# Patient Record
Sex: FEMALE | Race: WHITE | Employment: UNEMPLOYED | URBAN - METROPOLITAN AREA
[De-identification: names, ages, dates, MRNs, and addresses within clinical notes are randomized per-mention and may not be internally consistent; named-entity substitution may affect disease eponyms.]

---

## 2017-02-06 ENCOUNTER — OFFICE VISIT (OUTPATIENT)
Dept: ORTHOPEDIC SURGERY | Age: 82
End: 2017-02-06

## 2017-02-06 VITALS
BODY MASS INDEX: 20.09 KG/M2 | WEIGHT: 125 LBS | HEIGHT: 66 IN | RESPIRATION RATE: 16 BRPM | HEART RATE: 78 BPM | DIASTOLIC BLOOD PRESSURE: 88 MMHG | SYSTOLIC BLOOD PRESSURE: 134 MMHG

## 2017-02-06 DIAGNOSIS — S72.141D INTERTROCHANTERIC FRACTURE OF RIGHT FEMUR, CLOSED, WITH ROUTINE HEALING, SUBSEQUENT ENCOUNTER: Primary | ICD-10-CM

## 2017-02-06 PROCEDURE — 99213 OFFICE O/P EST LOW 20 MIN: CPT | Performed by: ORTHOPAEDIC SURGERY

## 2017-02-06 PROCEDURE — 73502 X-RAY EXAM HIP UNI 2-3 VIEWS: CPT | Performed by: ORTHOPAEDIC SURGERY

## 2017-02-06 RX ORDER — OMEGA-3 FATTY ACIDS/FISH OIL 300-1000MG
1 CAPSULE ORAL
COMMUNITY

## 2017-02-09 ENCOUNTER — OFFICE VISIT (OUTPATIENT)
Dept: ENDOCRINOLOGY | Age: 82
End: 2017-02-09

## 2017-02-09 VITALS
OXYGEN SATURATION: 96 % | BODY MASS INDEX: 20.62 KG/M2 | WEIGHT: 120.8 LBS | SYSTOLIC BLOOD PRESSURE: 135 MMHG | HEIGHT: 64 IN | HEART RATE: 79 BPM | RESPIRATION RATE: 16 BRPM | DIASTOLIC BLOOD PRESSURE: 86 MMHG

## 2017-02-09 DIAGNOSIS — M81.0 OSTEOPOROSIS: Primary | ICD-10-CM

## 2017-02-09 DIAGNOSIS — M81.0 OSTEOPOROSIS: ICD-10-CM

## 2017-02-09 LAB
A/G RATIO: 1.7 (ref 1.1–2.2)
ALBUMIN SERPL-MCNC: 4.3 G/DL (ref 3.4–5)
ALP BLD-CCNC: 72 U/L (ref 40–129)
ALT SERPL-CCNC: 13 U/L (ref 10–40)
ANION GAP SERPL CALCULATED.3IONS-SCNC: 13 MMOL/L (ref 3–16)
AST SERPL-CCNC: 19 U/L (ref 15–37)
BILIRUB SERPL-MCNC: 0.7 MG/DL (ref 0–1)
BUN BLDV-MCNC: 24 MG/DL (ref 7–20)
CALCIUM SERPL-MCNC: 10 MG/DL (ref 8.3–10.6)
CHLORIDE BLD-SCNC: 100 MMOL/L (ref 99–110)
CO2: 26 MMOL/L (ref 21–32)
CREAT SERPL-MCNC: 1.1 MG/DL (ref 0.6–1.2)
GFR AFRICAN AMERICAN: 58
GFR NON-AFRICAN AMERICAN: 48
GLOBULIN: 2.6 G/DL
GLUCOSE BLD-MCNC: 89 MG/DL (ref 70–99)
PARATHYROID HORMONE INTACT: 42.3 PG/ML (ref 14–72)
PHOSPHORUS: 3.8 MG/DL (ref 2.5–4.9)
POTASSIUM SERPL-SCNC: 4.9 MMOL/L (ref 3.5–5.1)
SODIUM BLD-SCNC: 139 MMOL/L (ref 136–145)
TOTAL PROTEIN: 6.9 G/DL (ref 6.4–8.2)
VITAMIN D 25-HYDROXY: 58.1 NG/ML

## 2017-02-09 PROCEDURE — 99205 OFFICE O/P NEW HI 60 MIN: CPT | Performed by: INTERNAL MEDICINE

## 2017-02-10 ENCOUNTER — TELEPHONE (OUTPATIENT)
Age: 82
End: 2017-02-10

## 2017-02-23 ENCOUNTER — TELEPHONE (OUTPATIENT)
Dept: ENDOCRINOLOGY | Age: 82
End: 2017-02-23

## 2017-02-28 ENCOUNTER — TELEPHONE (OUTPATIENT)
Dept: ENDOCRINOLOGY | Age: 82
End: 2017-02-28

## 2017-03-16 ENCOUNTER — NURSE ONLY (OUTPATIENT)
Age: 82
End: 2017-03-16

## 2017-03-16 DIAGNOSIS — M81.0 OSTEOPOROSIS: Primary | ICD-10-CM

## 2017-03-16 PROCEDURE — 96372 THER/PROPH/DIAG INJ SC/IM: CPT | Performed by: INTERNAL MEDICINE

## 2017-04-03 ENCOUNTER — OFFICE VISIT (OUTPATIENT)
Dept: ORTHOPEDIC SURGERY | Age: 82
End: 2017-04-03

## 2017-04-03 VITALS
SYSTOLIC BLOOD PRESSURE: 131 MMHG | HEIGHT: 63 IN | WEIGHT: 120 LBS | DIASTOLIC BLOOD PRESSURE: 83 MMHG | HEART RATE: 72 BPM | BODY MASS INDEX: 21.26 KG/M2

## 2017-04-03 DIAGNOSIS — S72.141A INTERTROCHANTERIC FRACTURE OF RIGHT FEMUR, CLOSED, INITIAL ENCOUNTER (HCC): Primary | ICD-10-CM

## 2017-04-03 PROCEDURE — 99213 OFFICE O/P EST LOW 20 MIN: CPT | Performed by: ORTHOPAEDIC SURGERY

## 2017-04-03 PROCEDURE — 73502 X-RAY EXAM HIP UNI 2-3 VIEWS: CPT | Performed by: ORTHOPAEDIC SURGERY

## 2017-09-06 ENCOUNTER — TELEPHONE (OUTPATIENT)
Dept: ENDOCRINOLOGY | Age: 82
End: 2017-09-06

## 2017-11-14 ENCOUNTER — HOSPITAL ENCOUNTER (OUTPATIENT)
Dept: OTHER | Age: 82
Discharge: HOME | End: 2017-11-14
Attending: NURSE PRACTITIONER | Admitting: NURSE PRACTITIONER

## 2017-11-14 DIAGNOSIS — R22.2 MASS IN CHEST: ICD-10-CM

## 2017-11-29 ENCOUNTER — HOSPITAL ENCOUNTER (OUTPATIENT)
Dept: CT IMAGING | Age: 82
Discharge: HOME | End: 2017-11-29
Attending: OBSTETRICS & GYNECOLOGY | Admitting: OBSTETRICS & GYNECOLOGY

## 2017-11-29 DIAGNOSIS — R22.2 LOCALIZED SWELLING, MASS AND LUMP, TRUNK: ICD-10-CM

## 2018-03-22 ENCOUNTER — OFFICE VISIT (OUTPATIENT)
Dept: ENDOCRINOLOGY | Age: 83
End: 2018-03-22

## 2018-03-22 VITALS
OXYGEN SATURATION: 97 % | WEIGHT: 120.6 LBS | RESPIRATION RATE: 14 BRPM | SYSTOLIC BLOOD PRESSURE: 126 MMHG | BODY MASS INDEX: 20.59 KG/M2 | HEART RATE: 72 BPM | HEIGHT: 64 IN | DIASTOLIC BLOOD PRESSURE: 85 MMHG

## 2018-03-22 DIAGNOSIS — M81.0 OSTEOPOROSIS, POST-MENOPAUSAL: Primary | ICD-10-CM

## 2018-03-22 PROCEDURE — 99213 OFFICE O/P EST LOW 20 MIN: CPT | Performed by: INTERNAL MEDICINE

## 2018-03-22 PROCEDURE — 96372 THER/PROPH/DIAG INJ SC/IM: CPT | Performed by: INTERNAL MEDICINE

## 2018-03-22 NOTE — PROGRESS NOTES
Subjective:     Juan Luis Paul is a  who is here for f/u evaluation of osteoporosis. Referred by Juanito Rangel MD     She was diagnosed with Osteoporsis 20 years ago,     DEXA scan   11/16       FINDINGS:   LUMBAR SPINE:       The bone mineral density in the lumbar spine including the L1 through L4   levels is measured at 0.826 g/cm2, which corresponds to a T-score of -2.0 and   a Z-score of 0.7.  This is within the osteopenia range by [facility] criteria.       The bone density has decreased by 1.5%.       LEFT HIP:       The bone mineral density in the total hip is measured at 0.646 g/cm2   corresponding to a T-score of -2.4 and a Z-score of -0.4.  This is within the   osteopenia range by [facility] criteria.       The bone mineral density of the femoral neck is measured at 0.609 g/cm2   corresponding to a T-score of -2.2 and a Z-score of 0.1.  This is within the   osteopenia range by WHO criteria.       The bone density has decreased by 0.1%.       RIGHT FOREARM:       The bone mineral density of the right 1/3 radius is measured at 0.521 g/cm2   corresponding to a T-score of -2.8 and a Z-score of 0.4.  This is within   osteoporosis range by [facility] criteria     2014 Dexa:  T-score for the LEFT hip is -2. 2.     .    Specifically, in the femoral neck it is -1.5. T score for the lumbar spine is -2. 0.        11/10 Dexa  LUMBAR SPINE-   Total BMD = 0.838g/cm2   T-Score = -1.9   WHO Classification- Osteopenia   Fracture Risk- Increased       RIGHT HIP-   Total BMD = 0.703g/cm2   T-Score = -2.0   WHO Classification- Osteopenia   Fracture Risk- Increased       LEFT HIP-   Total BMD = 0.680g/cm2   T-Score = -2.1   WHO Classification- Osteopenia   Fracture Risk- Increased     2008 Dexa    L1-4 T score -2.8  Femur total -2.5    History of fractures :  8/16  right intertrochanteric fracture  S/p nail  Fall    Pharmacological therapy for Osteoprosis:    Fosamax took for 10 years, stopped 10 years ago  Prolia since 3/17     FH of Osteoporosis:none   FH of hip fracture: none    Secondary causes of osteoprorsis: h/o cancer, chemotherapy, XRT    Calcium:  Diet 1800mg   Vitamin D:  D3 1000IU + 1000IU+ 600IU  Exercise: none    Follows up regularly with a dentist. No major dental procedures planned.     8/16 Hgb 9.0  Ca 7.7    Past Medical History:   Diagnosis Date    Breast cancer ([facility] [address]     Bilateral breast cancer and presence of BRCA1 gene mutation    Collagenous colitis     Hypertension     Osteoporosis     Ovarian cancer ([facility] [address]     Ovarian carcinoma diagnosed in 1998     Past Surgical History:   Procedure Laterality Date    BREAST BIOPSY      BREAST LUMPECTOMY  10/2009    Stage I (T1N0M0) invasive ductal carcinoma of the right breast, tumor size 1.5 cm, grade 3, estrogen-receptor negative, progesterone-receptor negative, HER-2/mikala 0 by immunohistochemistry, status post lumpectomy with positive deep margins    BREAST LUMPECTOMY  1983    Left breast carcinoma diagnosed  status post lumpectomy followed by seed implants and external beam radiation     HYSTERECTOMY       status post total abdominal hysterectomy with [id number]-oruryaib-knsfknbmrmdd followed adjuvant chemo    MASTECTOMY, RADICAL  2011    Bilateral - Dr Skylar Kasper PROSTATE/TRANSRECTAL VOL STUDY BRACHYTHERAPY       Current Outpatient Prescriptions   Medication Sig Dispense Refill    denosumab (PROLIA) 60 MG/ML SOLN SC injection Inject 1 mL into the skin once for 1 dose 1 mL 0    ibuprofen (ADVIL;MOTRIN) 200 MG CAPS Take 1 capsule by mouth      Multiple Vitamins-Minerals (THERAPEUTIC MULTIVITAMIN-MINERALS) tablet Take 1 tablet by mouth daily      magnesium oxide (MAG-OX) 400 MG tablet Take 400 mg by mouth daily      calcium citrate-vitamin D (CITRICAL + D) 315-250 MG-UNIT TABS Take 1 tablet by mouth daily (with breakfast)       bismuth subsalicylate (PEPTO BISMOL) 262 MG chewable tablet Take 524 mg by mouth 2 times daily       vitamin D

## 2018-09-27 ENCOUNTER — NURSE ONLY (OUTPATIENT)
Dept: ENDOCRINOLOGY | Age: 83
End: 2018-09-27
Payer: MEDICARE

## 2018-09-27 PROCEDURE — 96372 THER/PROPH/DIAG INJ SC/IM: CPT | Performed by: INTERNAL MEDICINE

## 2018-09-27 NOTE — PROGRESS NOTES
.. Prolia injection given to Dr. Christine Rosado Patient. Wellington [address]ruff 48 [id number]. Lot # [id number]  Exp 11/20          Injection given in left  Arm.  Office supplied

## 2019-02-12 ENCOUNTER — TELEPHONE (OUTPATIENT)
Dept: ENDOCRINOLOGY | Age: 84
End: 2019-02-12

## 2019-03-22 ENCOUNTER — HOSPITAL ENCOUNTER (OUTPATIENT)
Dept: WOMENS IMAGING | Age: 84
Discharge: HOME | End: 2019-03-22
Payer: MEDICARE

## 2019-03-22 DIAGNOSIS — M81.0 OSTEOPOROSIS, POST-MENOPAUSAL: ICD-10-CM

## 2019-03-22 PROCEDURE — 77080 DXA BONE DENSITY AXIAL: CPT

## 2019-03-28 ENCOUNTER — OFFICE VISIT (OUTPATIENT)
Dept: ENDOCRINOLOGY | Age: 84
End: 2019-03-28
Payer: MEDICARE

## 2019-03-28 VITALS
SYSTOLIC BLOOD PRESSURE: 120 MMHG | BODY MASS INDEX: 21.17 KG/M2 | OXYGEN SATURATION: 92 % | HEIGHT: 64 IN | HEART RATE: 83 BPM | DIASTOLIC BLOOD PRESSURE: 80 MMHG | WEIGHT: 124 LBS | RESPIRATION RATE: 16 BRPM

## 2019-03-28 DIAGNOSIS — M81.0 OSTEOPOROSIS, POST-MENOPAUSAL: Primary | ICD-10-CM

## 2019-03-28 PROCEDURE — 99213 OFFICE O/P EST LOW 20 MIN: CPT | Performed by: INTERNAL MEDICINE

## 2019-03-28 PROCEDURE — 96372 THER/PROPH/DIAG INJ SC/IM: CPT | Performed by: INTERNAL MEDICINE

## 2019-10-02 DIAGNOSIS — M81.0 OSTEOPOROSIS, POST-MENOPAUSAL: Primary | ICD-10-CM

## 2019-10-03 ENCOUNTER — NURSE ONLY (OUTPATIENT)
Dept: ENDOCRINOLOGY | Age: 84
End: 2019-10-03
Payer: MEDICARE

## 2019-10-03 DIAGNOSIS — M80.00XA AGE-RELATED OSTEOPOROSIS WITH CURRENT PATHOLOGICAL FRACTURE, INITIAL ENCOUNTER: Primary | ICD-10-CM

## 2019-10-03 PROCEDURE — 96372 THER/PROPH/DIAG INJ SC/IM: CPT | Performed by: INTERNAL MEDICINE

## 2020-04-02 ENCOUNTER — VIRTUAL VISIT (OUTPATIENT)
Dept: ENDOCRINOLOGY | Age: 85
End: 2020-04-02

## 2020-04-02 ENCOUNTER — NURSE ONLY (OUTPATIENT)
Dept: ENDOCRINOLOGY | Age: 85
End: 2020-04-02
Payer: MEDICARE

## 2020-04-02 PROCEDURE — 96372 THER/PROPH/DIAG INJ SC/IM: CPT | Performed by: INTERNAL MEDICINE

## 2020-04-03 ENCOUNTER — VIRTUAL VISIT (OUTPATIENT)
Dept: ENDOCRINOLOGY | Age: 85
End: 2020-04-03
Payer: MEDICARE

## 2020-04-03 PROCEDURE — G2012 BRIEF CHECK IN BY MD/QHP: HCPCS | Performed by: INTERNAL MEDICINE

## 2020-04-03 NOTE — PROGRESS NOTES
fracture: none    Secondary causes of osteoprorsis: h/o cancer, chemotherapy, XRT    Calcium:  Diet 1800mg   Vitamin D:  D3 1000IU + 1000IU+ 600IU  41 in 2009  Exercise: none    Follows up regularly with a dentist. No major dental procedures planned. 8/16 Hgb 9.0  Ca 7.7    Past Medical History:   Diagnosis Date    Breast cancer ([facility] [address]     Bilateral breast cancer and presence of BRCA1 gene mutation    Collagenous colitis     Hypertension     Osteoporosis     Ovarian cancer ([facility] [address]     Ovarian carcinoma diagnosed in 1998     Past Surgical History:   Procedure Laterality Date    BREAST BIOPSY      BREAST LUMPECTOMY  10/2009    Stage I (T1N0M0) invasive ductal carcinoma of the right breast, tumor size 1.5 cm, grade 3, estrogen-receptor negative, progesterone-receptor negative, HER-2/mikala 0 by immunohistochemistry, status post lumpectomy with positive deep margins    BREAST LUMPECTOMY  1983    Left breast carcinoma diagnosed  status post lumpectomy followed by seed implants and external beam radiation     HYSTERECTOMY       status post total abdominal hysterectomy with mxqdzydlq-siqocnkf-hynnkgrfckvt followed adjuvant chemo    MASTECTOMY, RADICAL  2011    Bilateral - Dr Sanjuana Ryan PROSTATE/TRANSRECTAL VOL STUDY BRACHYTHERAPY       Current Outpatient Medications   Medication Sig Dispense Refill    denosumab (PROLIA) 60 MG/ML SOSY SC injection Inject into the skin      ibuprofen (ADVIL;MOTRIN) 200 MG CAPS Take 1 capsule by mouth      Multiple Vitamins-Minerals (THERAPEUTIC MULTIVITAMIN-MINERALS) tablet Take 1 tablet by mouth daily      calcium citrate-vitamin D (CITRICAL + D) 315-250 MG-UNIT TABS Take 1 tablet by mouth daily (with breakfast)       bismuth subsalicylate (PEPTO BISMOL) 262 MG chewable tablet Take 524 mg by mouth 2 times daily       vitamin D (CHOLECALCIFEROL) 1000 UNIT TABS tablet Take 1,000 Units by mouth daily.       denosumab (PROLIA) 60 MG/ML SOLN SC injection

## 2020-10-08 ENCOUNTER — NURSE ONLY (OUTPATIENT)
Dept: ENDOCRINOLOGY | Age: 85
End: 2020-10-08
Payer: MEDICARE

## 2020-10-08 PROCEDURE — 96372 THER/PROPH/DIAG INJ SC/IM: CPT | Performed by: INTERNAL MEDICINE

## 2020-10-08 NOTE — PROGRESS NOTES
Informed patient if any signs of redness,rash,swelling or unusual symptoms occur, please contact the office. Prolia given per physician order. Prolia given in left arm.     Wellington [address]ruff 48: [id number]  Lot: [id number]  Exp: 11/22  Physician provided

## 2021-03-22 ENCOUNTER — HOSPITAL ENCOUNTER (OUTPATIENT)
Dept: WOMENS IMAGING | Age: 86
Discharge: HOME | End: 2021-03-22
Payer: MEDICARE

## 2021-03-22 DIAGNOSIS — M81.0 AGE-RELATED OSTEOPOROSIS WITHOUT CURRENT PATHOLOGICAL FRACTURE: ICD-10-CM

## 2021-03-22 PROCEDURE — 77080 DXA BONE DENSITY AXIAL: CPT

## 2021-04-15 ENCOUNTER — OFFICE VISIT (OUTPATIENT)
Dept: ENDOCRINOLOGY | Age: 86
End: 2021-04-15
Payer: MEDICARE

## 2021-04-15 VITALS
HEIGHT: 63 IN | RESPIRATION RATE: 18 BRPM | OXYGEN SATURATION: 97 % | HEART RATE: 89 BPM | BODY MASS INDEX: 22.39 KG/M2 | WEIGHT: 126.4 LBS | DIASTOLIC BLOOD PRESSURE: 66 MMHG | SYSTOLIC BLOOD PRESSURE: 112 MMHG

## 2021-04-15 DIAGNOSIS — M81.0 AGE-RELATED OSTEOPOROSIS WITHOUT CURRENT PATHOLOGICAL FRACTURE: ICD-10-CM

## 2021-04-15 DIAGNOSIS — E83.52 HYPERCALCEMIA: ICD-10-CM

## 2021-04-15 DIAGNOSIS — M81.0 AGE-RELATED OSTEOPOROSIS WITHOUT CURRENT PATHOLOGICAL FRACTURE: Primary | ICD-10-CM

## 2021-04-15 LAB
A/G RATIO: 1.7 (ref 1.1–2.2)
ALBUMIN SERPL-MCNC: 4.7 G/DL (ref 3.4–5)
ALP BLD-CCNC: 65 U/L (ref 40–129)
ALT SERPL-CCNC: 12 U/L (ref 10–40)
ANION GAP SERPL CALCULATED.3IONS-SCNC: 15 MMOL/L (ref 3–16)
AST SERPL-CCNC: 19 U/L (ref 15–37)
BILIRUB SERPL-MCNC: 0.5 MG/DL (ref 0–1)
BUN BLDV-MCNC: 24 MG/DL (ref 7–20)
CALCIUM SERPL-MCNC: 10.4 MG/DL (ref 8.3–10.6)
CHLORIDE BLD-SCNC: 101 MMOL/L (ref 99–110)
CO2: 27 MMOL/L (ref 21–32)
CREAT SERPL-MCNC: 1.2 MG/DL (ref 0.6–1.2)
GFR AFRICAN AMERICAN: 52
GFR NON-AFRICAN AMERICAN: 43
GLOBULIN: 2.8 G/DL
GLUCOSE BLD-MCNC: 97 MG/DL (ref 70–99)
PARATHYROID HORMONE INTACT: 57.6 PG/ML (ref 14–72)
POTASSIUM SERPL-SCNC: 4.5 MMOL/L (ref 3.5–5.1)
SODIUM BLD-SCNC: 143 MMOL/L (ref 136–145)
TOTAL PROTEIN: 7.5 G/DL (ref 6.4–8.2)
VITAMIN D 25-HYDROXY: 74.6 NG/ML

## 2021-04-15 PROCEDURE — 99214 OFFICE O/P EST MOD 30 MIN: CPT | Performed by: INTERNAL MEDICINE

## 2021-04-15 NOTE — PROGRESS NOTES
Subjective:       Teodora Bray is a  who is here for f/u evaluation of osteoporosis. Interim:   No fall fractures  No issues  Due for prolia but needs PA      Referred by Jt Larsen MD     She was diagnosed with Osteoporsis 20 years ago,     Dexa 3/21    LUMBAR SPINE:       The bone mineral density in the lumbar spine including the L1 through L4   levels is measured at 0.870 g/cm2, which corresponds to a T-score of -1.6 and   a Z-score of 1.2.  This is within the osteopenia range by [facility] criteria.       Since the prior exam, there has been 5.2 % reduction from total bone mineral   density of 0.918 in the lumbar spine bone mineral density, which is   statistically significant.       LEFT HIP:       The bone mineral density in the left total hip is measured at 0.735 g/cm2   corresponding to a T-score of -1.7 and a Z-score of 0.6.  This is within the   osteopenia range by [facility] criteria.       The bone mineral density of the left femoral neck is measured at 0.686 g/cm2   corresponding to a T-score of -1.5 and a Z-score of .  This is within the   osteopenia range by [facility] criteria.       Since the prior exam, there has been 15.2 % reduction in the total hip bone   mineral density, which is statistically significant.       RIGHT FOREARM FOR COMPARISON:       The bone mineral density of the left 1/3 radius is measured at 0.356 g/cm2   corresponding to a T-score of -4.1 and a Z-score of -0.6.  This is within   osteoporosis range by WHO criteria.  There has been a 11% reduction since the   prior study.        DEXA scan  3/19  LUMBAR SPINE:       The bone mineral density in the lumbar spine including the L1 through L4   levels is measured at 0.918 g/cm2, which corresponds to a T-score of -1.2 and   a Z-score of 1.6.  This is within the osteopenia range by WHO criteria.       The bone mineral density in the lumbar spine has increased by 11.2% in   comparison with the prior study from 11/23/2016.       LEFT HIP:     The bone mineral density in the total hip is measured at 0.673 g/cm2   corresponding to a T-score of -2.2 and a Z-score of 0.  This is within the   osteopenia range by [facility] criteria.       The bone mineral density of the femoral neck is measured at 0.596 g/cm2   corresponding to a T-score of -2.3 and a Z-score of 0.1.  This is within the   osteopenia range by [facility] criteria.       The bone mineral density in the left hip has decreased by 2.2% in comparison   with the prior study from 11/23/2016.       RIGHT FOREARM:       The bone mineral density in the 1/3 radius is measured at 0.518 g/cm2   corresponding to a T-score of -2.9 and a Z-score of 0.6.  This is within the   osteoporosis range by [facility] criteria.       The bone mineral density in the right radius has increased by 0.6% in   comparison with the prior study from 11/23/2016.            11/16       FINDINGS:   LUMBAR SPINE:       The bone mineral density in the lumbar spine including the L1 through L4   levels is measured at 0.826 g/cm2, which corresponds to a T-score of -2.0 and   a Z-score of 0.7.  This is within the osteopenia range by WHO criteria.       The bone density has decreased by 1.5%.       LEFT HIP:       The bone mineral density in the total hip is measured at 0.646 g/cm2   corresponding to a T-score of -2.4 and a Z-score of -0.4.  This is within the   osteopenia range by [facility] criteria.       The bone mineral density of the femoral neck is measured at 0.609 g/cm2   corresponding to a T-score of -2.2 and a Z-score of 0.1.  This is within the   osteopenia range by WHO criteria.       The bone density has decreased by 0.1%.       RIGHT FOREARM:       The bone mineral density of the right 1/3 radius is measured at 0.521 g/cm2   corresponding to a T-score of -2.8 and a Z-score of 0.4.  This is within   osteoporosis range by [facility] criteria     2014 Dexa:  T-score for the LEFT hip is -2. 2.     .    Specifically, in the femoral neck it is -1.5.     T score for the lumbar spine is -2. 0.        11/10 Dexa  LUMBAR SPINE-   Total BMD = 0.838g/cm2   T-Score = -1.9   WHO Classification- Osteopenia   Fracture Risk- Increased       RIGHT HIP-   Total BMD = 0.703g/cm2   T-Score = -2.0   WHO Classification- Osteopenia   Fracture Risk- Increased       LEFT HIP-   Total BMD = 0.680g/cm2   T-Score = -2.1   WHO Classification- Osteopenia   Fracture Risk- Increased     2008 Dexa    L1-4 T score -2.8  Femur total -2.5    History of fractures :  8/16  right intertrochanteric fracture  S/p nail  Fall    2016: T3 fracture, had a fall , felt on spine  Compression fracture is seen at T3 with sclerosis    Pharmacological therapy for Osteoprosis:    Fosamax took for 10 years, stopped 10 years ago  Prolia since 3/17     FH of Osteoporosis:none   FH of hip fracture: none    Secondary causes of osteoprorsis: h/o cancer, chemotherapy, XRT    Calcium:  Diet 1800mg  Supplement 250mg 2 tab daily  Vitamin D:  D in Ca supplement 41 in 2009  Exercise: none    Follows up regularly with a dentist. No major dental procedures planned.     8/16 Hgb 9.0  Ca 7.7    7/20 she had high calcium  Ca 10.7   11/20 Ca 10.2    Past Medical History:   Diagnosis Date    Breast cancer ([facility] [address]     Bilateral breast cancer and presence of BRCA1 gene mutation    Collagenous colitis     Hypertension     Osteoporosis     Ovarian cancer ([facility] [address]     Ovarian carcinoma diagnosed in 1998     Past Surgical History:   Procedure Laterality Date    BREAST BIOPSY      BREAST LUMPECTOMY  10/2009    Stage I (T1N0M0) invasive ductal carcinoma of the right breast, tumor size 1.5 cm, grade 3, estrogen-receptor negative, progesterone-receptor negative, HER-2/mikala 0 by immunohistochemistry, status post lumpectomy with positive deep margins    BREAST LUMPECTOMY  1983    Left breast carcinoma diagnosed  status post lumpectomy followed by seed implants and external beam radiation     HYSTERECTOMY       status post total abdominal hysterectomy with gjsszrusc-nvnqzetm-ltrmykrhmcds followed adjuvant chemo    MASTECTOMY, RADICAL  2011    Bilateral - Dr Katharine Matthews PROSTATE/TRANSRECTAL VOL STUDY BRACHYTHERAPY       Current Outpatient Medications   Medication Sig Dispense Refill    denosumab (PROLIA) 60 MG/ML SOSY SC injection Inject into the skin      ibuprofen (ADVIL;MOTRIN) 200 MG CAPS Take 1 capsule by mouth      Multiple Vitamins-Minerals (THERAPEUTIC MULTIVITAMIN-MINERALS) tablet Take 1 tablet by mouth daily      calcium citrate-vitamin D (CITRICAL + D) 315-250 MG-UNIT TABS Take 1 tablet by mouth daily (with breakfast)       bismuth subsalicylate (PEPTO BISMOL) 262 MG chewable tablet Take 524 mg by mouth 2 times daily       vitamin D (CHOLECALCIFEROL) 1000 UNIT TABS tablet Take 1,000 Units by mouth daily.  denosumab (PROLIA) 60 MG/ML SOLN SC injection Inject 1 mL into the skin once for 1 dose 1 mL 0     No current facility-administered medications for this visit. Review of Systems    Scanned, reviewed    There were no vitals filed for this visit. Constitutional: Well-developed, appears stated age, cooperative, in no acute distress  H/E/N/M/T:atraumatic, normocephalic, external ears, nose, lips normal without lesions  Eyes: Lids, lashes, conjunctivae and sclerae normal, No proptosis, no redness  Neck: supple, symmetrical, no swelling  Skin: No obvious rashes or lesions present. Skin and hair texture normal  Psychiatric: Judgement and Insight:  judgement and insight appear normal  Neuro: Normal without focal findings, speech is normal normal, speech is spontaneous  Chest: No labored breathing, no chest deformity, no stridor  Musculoskeletal: No joint deformity, swelling    Lab Review  No results found for: TSH  No results found for: FREET4      Assessment:     1. Osteoporosis: H/o fragility fracture. Was on fosamax in the past. off therapy for many years.   Dexa 2019 showed increased BMD at spine by 11 %, at hip and distal radius non significant change. Ruled out secondary causes. Recommended pharmacological therapy. Started prolia in 2017, tolerating. She does have a history of spine fracture  Dexa 3/31 shows 5.2% decline at spine and 15.2 % increase at hip ( error in report as mentions decline, reviewed manually). 11.5% decline in distal radius. Will work up for other causes  Osteopenia range in spine and hip  She had a high calcium in 7/20, work up for hyperparathyroidism again. 2.H/o Breast Cancer  3. H/o Ovarian Ca  4. Hypercalcemia: Noticed on labs in 2020, check PTH    Plan:     Order labs to r/o other causes  May switch to reclast or anabolic agent  Post menopausal osteoporosis . With fragility fracture. Discussed management of osteoporosis in detail. Discussed treatment options with her. Told her that given high risk of fracture and osteoporosis, I would recommend therapy for her, started prolia  Discussed the side effects including Osteonecrosis of the jaw and Atypical fractures. Patient understands that the risk is low.    In general advised to avoid falls

## 2021-04-19 ENCOUNTER — TELEPHONE (OUTPATIENT)
Dept: ENDOCRINOLOGY | Age: 86
End: 2021-04-19

## 2021-04-19 NOTE — TELEPHONE ENCOUNTER
Pt called and stated she was due for her prolia at her visit last week but the nurse forgot to get an approval.  She called back and one still has not been done.   I contacted nikkie to get this started

## 2021-05-06 ENCOUNTER — NURSE ONLY (OUTPATIENT)
Dept: ENDOCRINOLOGY | Age: 86
End: 2021-05-06
Payer: MEDICARE

## 2021-05-06 DIAGNOSIS — M81.0 AGE-RELATED OSTEOPOROSIS WITHOUT CURRENT PATHOLOGICAL FRACTURE: Primary | ICD-10-CM

## 2021-05-06 PROCEDURE — 96372 THER/PROPH/DIAG INJ SC/IM: CPT | Performed by: INTERNAL MEDICINE

## 2021-05-06 NOTE — PROGRESS NOTES
Prolia injection given to Dr. Rain Joyce        Patient. Wellington [address] 48 [phone]. Lot #     [id number]          Exp:   05/23        Injection given in      Left    Arm. Patient advised to wait in office 20 minutes in case of reaction such as SOB, rash, hives, itching.    Office supplied

## 2021-11-11 ENCOUNTER — OFFICE VISIT (OUTPATIENT)
Dept: ENDOCRINOLOGY | Age: 86
End: 2021-11-11
Payer: MEDICARE

## 2021-11-11 VITALS
OXYGEN SATURATION: 93 % | DIASTOLIC BLOOD PRESSURE: 70 MMHG | BODY MASS INDEX: 19.31 KG/M2 | SYSTOLIC BLOOD PRESSURE: 128 MMHG | WEIGHT: 109 LBS | HEIGHT: 63 IN | HEART RATE: 90 BPM

## 2021-11-11 DIAGNOSIS — E83.52 HYPERCALCEMIA: ICD-10-CM

## 2021-11-11 DIAGNOSIS — M81.0 AGE-RELATED OSTEOPOROSIS WITHOUT CURRENT PATHOLOGICAL FRACTURE: Primary | ICD-10-CM

## 2021-11-11 PROCEDURE — 99214 OFFICE O/P EST MOD 30 MIN: CPT | Performed by: INTERNAL MEDICINE

## 2021-11-11 PROCEDURE — 96372 THER/PROPH/DIAG INJ SC/IM: CPT | Performed by: INTERNAL MEDICINE

## 2021-11-11 RX ORDER — ACITRETIN 10 MG/1
CAPSULE ORAL
COMMUNITY
Start: 2021-10-10 | End: 2022-09-26 | Stop reason: ALTCHOICE

## 2021-11-11 NOTE — PROGRESS NOTES
Prolia injection given to           Patient. Wellington Woodruff 48 [phone]. Lot #   [id number]            Exp:   02/24        Injection given in  left        Arm. Patient advised to wait in office 20 minutes in case of reaction such as SOB, rash, hives, itching.    Office supplied

## 2021-11-11 NOTE — PROGRESS NOTES
Subjective:       Wei Marquez is a  who is here for f/u evaluation of osteoporosis. Interim:   She had a fall  Left collar bone fracture      Referred by Rupali Lawson MD     She was diagnosed with Osteoporsis 20 years ago,     Dexa 3/21    LUMBAR SPINE:       The bone mineral density in the lumbar spine including the L1 through L4   levels is measured at 0.870 g/cm2, which corresponds to a T-score of -1.6 and   a Z-score of 1.2.  This is within the osteopenia range by [facility] criteria.       Since the prior exam, there has been 5.2 % reduction from total bone mineral   density of 0.918 in the lumbar spine bone mineral density, which is   statistically significant.       LEFT HIP:       The bone mineral density in the left total hip is measured at 0.735 g/cm2   corresponding to a T-score of -1.7 and a Z-score of 0.6.  This is within the   osteopenia range by [facility] criteria.       The bone mineral density of the left femoral neck is measured at 0.686 g/cm2   corresponding to a T-score of -1.5 and a Z-score of .  This is within the   osteopenia range by [facility] criteria.       Since the prior exam, there has been 15.2 % reduction in the total hip bone   mineral density, which is statistically significant.       RIGHT FOREARM FOR COMPARISON:       The bone mineral density of the left 1/3 radius is measured at 0.356 g/cm2   corresponding to a T-score of -4.1 and a Z-score of -0.6.  This is within   osteoporosis range by WHO criteria.  There has been a 11% reduction since the   prior study.        DEXA scan  3/19  LUMBAR SPINE:       The bone mineral density in the lumbar spine including the L1 through L4   levels is measured at 0.918 g/cm2, which corresponds to a T-score of -1.2 and   a Z-score of 1.6.  This is within the osteopenia range by WHO criteria.       The bone mineral density in the lumbar spine has increased by 11.2% in   comparison with the prior study from 11/23/2016.       LEFT HIP:       The bone mineral density in the total hip is measured at 0.673 g/cm2   corresponding to a T-score of -2.2 and a Z-score of 0.  This is within the   osteopenia range by [facility] criteria.       The bone mineral density of the femoral neck is measured at 0.596 g/cm2   corresponding to a T-score of -2.3 and a Z-score of 0.1.  This is within the   osteopenia range by [facility] criteria.       The bone mineral density in the left hip has decreased by 2.2% in comparison   with the prior study from 11/23/2016.       RIGHT FOREARM:       The bone mineral density in the 1/3 radius is measured at 0.518 g/cm2   corresponding to a T-score of -2.9 and a Z-score of 0.6.  This is within the   osteoporosis range by [facility] criteria.       The bone mineral density in the right radius has increased by 0.6% in   comparison with the prior study from 11/23/2016.            11/16       FINDINGS:   LUMBAR SPINE:       The bone mineral density in the lumbar spine including the L1 through L4   levels is measured at 0.826 g/cm2, which corresponds to a T-score of -2.0 and   a Z-score of 0.7.  This is within the osteopenia range by WHO criteria.       The bone density has decreased by 1.5%.       LEFT HIP:       The bone mineral density in the total hip is measured at 0.646 g/cm2   corresponding to a T-score of -2.4 and a Z-score of -0.4.  This is within the   osteopenia range by [facility] criteria.       The bone mineral density of the femoral neck is measured at 0.609 g/cm2   corresponding to a T-score of -2.2 and a Z-score of 0.1.  This is within the   osteopenia range by WHO criteria.       The bone density has decreased by 0.1%.       RIGHT FOREARM:       The bone mineral density of the right 1/3 radius is measured at 0.521 g/cm2   corresponding to a T-score of -2.8 and a Z-score of 0.4.  This is within   osteoporosis range by [facility] criteria     2014 Dexa:  T-score for the LEFT hip is -2. 2.     .    Specifically, in the femoral neck it is -1.5.     T score for the lumbar spine is -2. 0.        11/10 Dexa  LUMBAR SPINE-   Total BMD = 0.838g/cm2   T-Score = -1.9   WHO Classification- Osteopenia   Fracture Risk- Increased       RIGHT HIP-   Total BMD = 0.703g/cm2   T-Score = -2.0   WHO Classification- Osteopenia   Fracture Risk- Increased       LEFT HIP-   Total BMD = 0.680g/cm2   T-Score = -2.1   WHO Classification- Osteopenia   Fracture Risk- Increased     2008 Dexa    L1-4 T score -2.8  Femur total -2.5    History of fractures :  8/16  right intertrochanteric fracture  S/p nail  Fall    2016: T3 fracture, had a fall , felt on spine  Compression fracture is seen at T3 with sclerosis    Pharmacological therapy for Osteoprosis:    Fosamax took for 10 years, stopped 10 years ago  Prolia since 3/17     FH of Osteoporosis:none   FH of hip fracture: none    Secondary causes of osteoprorsis: h/o cancer, chemotherapy, XRT    Calcium:  Diet 1800mg  Supplement 250mg 2 tab daily  Vitamin D:  D in Ca supplement 41 in 2009  Exercise: none    Follows up regularly with a dentist. No major dental procedures planned.     8/16 Hgb 9.0  Ca 7.7    7/20 she had high calcium  Ca 10.7   11/20 Ca 10.2  4/21 Ca 10.4  PTH 57.6    Past Medical History:   Diagnosis Date    Breast cancer ([facility] [address]     Bilateral breast cancer and presence of BRCA1 gene mutation    Broken clavicle 08/06/2021    Collagenous colitis     Hypertension     Osteoporosis     Ovarian cancer ([facility] [address]     Ovarian carcinoma diagnosed in 1998     Past Surgical History:   Procedure Laterality Date    BREAST BIOPSY      BREAST LUMPECTOMY  10/2009    Stage I (T1N0M0) invasive ductal carcinoma of the right breast, tumor size 1.5 cm, grade 3, estrogen-receptor negative, progesterone-receptor negative, HER-2/mikala 0 by immunohistochemistry, status post lumpectomy with positive deep margins    BREAST LUMPECTOMY  1983    Left breast carcinoma diagnosed  status post lumpectomy followed by seed implants and external beam radiation     HYSTERECTOMY status post total abdominal hysterectomy with [id number] followed adjuvant chemo    MASTECTOMY, RADICAL  2011    Bilateral - Dr Omega Daley PROSTATE/TRANSRECTAL VOL STUDY BRACHYTHERAPY       Current Outpatient Medications   Medication Sig Dispense Refill    denosumab (PROLIA) 60 MG/ML SOSY SC injection Inject into the skin      ibuprofen (ADVIL;MOTRIN) 200 MG CAPS Take 1 capsule by mouth      Multiple Vitamins-Minerals (THERAPEUTIC MULTIVITAMIN-MINERALS) tablet Take 1 tablet by mouth daily      calcium citrate-vitamin D (CITRICAL + D) 315-250 MG-UNIT TABS Take 1 tablet by mouth daily (with breakfast)       bismuth subsalicylate (PEPTO BISMOL) 262 MG chewable tablet Take 524 mg by mouth 2 times daily       vitamin D (CHOLECALCIFEROL) 1000 UNIT TABS tablet Take 1,000 Units by mouth daily.  denosumab (PROLIA) 60 MG/ML SOLN SC injection Inject 1 mL into the skin once for 1 dose 1 mL 0     No current facility-administered medications for this visit. Review of Systems    Scanned, reviewed    Vitals:    11/11/21 1210   BP: 128/70   Pulse: 90   SpO2: 93%       Constitutional: Well-developed, appears stated age, cooperative, in no acute distress  H/E/N/M/T:atraumatic, normocephalic, external ears, nose, lips normal without lesions  Eyes: Lids, lashes, conjunctivae and sclerae normal, No proptosis, no redness  Neck: supple, symmetrical, no swelling  Skin: No obvious rashes or lesions present. Skin and hair texture normal  Psychiatric: Judgement and Insight:  judgement and insight appear normal  Neuro: Normal without focal findings, speech is normal normal, speech is spontaneous  Chest: No labored breathing, no chest deformity, no stridor  Musculoskeletal: No joint deformity, swelling    Lab Review  No results found for: TSH  No results found for: FREET4      Assessment:     1. Osteoporosis: H/o fragility fracture. Was on fosamax in the past. off therapy for many

## 2022-04-26 ENCOUNTER — TELEPHONE (OUTPATIENT)
Dept: ENDOCRINOLOGY | Age: 87
End: 2022-04-26

## 2022-05-20 ENCOUNTER — TELEPHONE (OUTPATIENT)
Dept: ENDOCRINOLOGY | Age: 87
End: 2022-05-20

## 2022-05-20 NOTE — TELEPHONE ENCOUNTER
Pt states she missed her appt yesterday for her Prolia. It shows that she was suppose to come in for Dr's appt though on schedule w/ Dr Noni Lopez and the week before that she \"no showed\" her Dr's appt? Not sure if she needs 's appt or nurse appt for Prolia or both?     #[phone]

## 2022-05-26 ENCOUNTER — NURSE ONLY (OUTPATIENT)
Dept: ENDOCRINOLOGY | Age: 87
End: 2022-05-26

## 2022-06-09 NOTE — PROGRESS NOTES
Prolia injection given to Dr. Faye Corona         Patient. Wellington Woodruff 47 [phone]. Lot #               Exp:           Injection given in          Arm. Patient advised to wait in office 20 minutes in case of reaction such as SOB, rash, hives, itching.      Office supplied

## 2022-09-26 ENCOUNTER — OFFICE VISIT (OUTPATIENT)
Dept: ENDOCRINOLOGY | Age: 87
End: 2022-09-26
Payer: MEDICARE

## 2022-09-26 VITALS
SYSTOLIC BLOOD PRESSURE: 120 MMHG | DIASTOLIC BLOOD PRESSURE: 76 MMHG | HEIGHT: 63 IN | OXYGEN SATURATION: 98 % | WEIGHT: 102.4 LBS | BODY MASS INDEX: 18.14 KG/M2

## 2022-09-26 DIAGNOSIS — M81.0 AGE-RELATED OSTEOPOROSIS WITHOUT CURRENT PATHOLOGICAL FRACTURE: Primary | ICD-10-CM

## 2022-09-26 PROCEDURE — 1123F ACP DISCUSS/DSCN MKR DOCD: CPT | Performed by: INTERNAL MEDICINE

## 2022-09-26 PROCEDURE — 99214 OFFICE O/P EST MOD 30 MIN: CPT | Performed by: INTERNAL MEDICINE

## 2022-09-26 RX ORDER — CALCIUM CARBONATE 500(1250)
TABLET ORAL
COMMUNITY

## 2022-09-26 RX ORDER — OXYCODONE HYDROCHLORIDE AND ACETAMINOPHEN 5; 325 MG/1; MG/1
TABLET ORAL
COMMUNITY
Start: 2022-09-05 | End: 2022-09-26 | Stop reason: ALTCHOICE

## 2022-09-26 NOTE — PROGRESS NOTES
Subjective:       Wei Marquez is a  who is here for f/u evaluation of osteoporosis. Interim:   She had a fall  Rib fracture  Had last prolia 5/22  Next due 11/22  She was advised by PCP to use walker    Referred by Rupali Lawson MD     She was diagnosed with Osteoporsis 20 years ago,     Dexa 3/21    LUMBAR SPINE:       The bone mineral density in the lumbar spine including the L1 through L4   levels is measured at 0.870 g/cm2, which corresponds to a T-score of -1.6 and   a Z-score of 1.2. This is within the osteopenia range by WHO criteria. Since the prior exam, there has been 5.2 % reduction from total bone mineral   density of 0.918 in the lumbar spine bone mineral density, which is   statistically significant. LEFT HIP:       The bone mineral density in the left total hip is measured at 0.735 g/cm2   corresponding to a T-score of -1.7 and a Z-score of 0.6. This is within the   osteopenia range by WHO criteria. The bone mineral density of the left femoral neck is measured at 0.686 g/cm2   corresponding to a T-score of -1.5 and a Z-score of . This is within the   osteopenia range by WHO criteria. Since the prior exam, there has been 15.2 % reduction in the total hip bone   mineral density, which is statistically significant. RIGHT FOREARM FOR COMPARISON:       The bone mineral density of the left 1/3 radius is measured at 0.356 g/cm2   corresponding to a T-score of -4.1 and a Z-score of -0.6. This is within   osteoporosis range by WHO criteria. There has been a 11% reduction since the   prior study. DEXA scan  3/19  LUMBAR SPINE:       The bone mineral density in the lumbar spine including the L1 through L4   levels is measured at 0.918 g/cm2, which corresponds to a T-score of -1.2 and   a Z-score of 1.6. This is within the osteopenia range by WHO criteria.        The bone mineral density in the lumbar spine has increased by 11.2% in   comparison with the prior study from 11/23/2016. LEFT HIP:       The bone mineral density in the total hip is measured at 0.673 g/cm2   corresponding to a T-score of -2.2 and a Z-score of 0. This is within the   osteopenia range by WHO criteria. The bone mineral density of the femoral neck is measured at 0.596 g/cm2   corresponding to a T-score of -2.3 and a Z-score of 0.1. This is within the   osteopenia range by WHO criteria. The bone mineral density in the left hip has decreased by 2.2% in comparison   with the prior study from 11/23/2016. RIGHT FOREARM:       The bone mineral density in the 1/3 radius is measured at 0.518 g/cm2   corresponding to a T-score of -2.9 and a Z-score of 0.6. This is within the   osteoporosis range by WHO criteria. The bone mineral density in the right radius has increased by 0.6% in   comparison with the prior study from 11/23/2016.            11/16       FINDINGS:   LUMBAR SPINE:       The bone mineral density in the lumbar spine including the L1 through L4   levels is measured at 0.826 g/cm2, which corresponds to a T-score of -2.0 and   a Z-score of 0.7. This is within the osteopenia range by WHO criteria. The bone density has decreased by 1.5%. LEFT HIP:       The bone mineral density in the total hip is measured at 0.646 g/cm2   corresponding to a T-score of -2.4 and a Z-score of -0.4. This is within the   osteopenia range by WHO criteria. The bone mineral density of the femoral neck is measured at 0.609 g/cm2   corresponding to a T-score of -2.2 and a Z-score of 0.1. This is within the   osteopenia range by WHO criteria. The bone density has decreased by 0.1%. RIGHT FOREARM:       The bone mineral density of the right 1/3 radius is measured at 0.521 g/cm2   corresponding to a T-score of -2.8 and a Z-score of 0.4. This is within   osteoporosis range by [facility] criteria     2014 Dexa:  T-score for the LEFT hip is -2.2.     .     Specifically, in the femoral neck it is -1.5. T score for the lumbar spine is -2.0.        11/10 Dexa  LUMBAR SPINE-   Total BMD = 0.838g/cm2   T-Score = -1.9   WHO Classification- Osteopenia   Fracture Risk- Increased       RIGHT HIP-   Total BMD = 0.703g/cm2   T-Score = -2.0   WHO Classification- Osteopenia   Fracture Risk- Increased       LEFT HIP-   Total BMD = 0.680g/cm2   T-Score = -2.1   WHO Classification- Osteopenia   Fracture Risk- Increased     2008 Dexa    L1-4 T score -2.8  Femur total -2.5    History of fractures :  8/16  right intertrochanteric fracture  S/p nail  Fall    2016: T3 fracture, had a fall , felt on spine  Compression fracture is seen at T3 with sclerosis    Pharmacological therapy for Osteoprosis:    Fosamax took for 10 years, stopped 10 years ago  Prolia since 3/17     FH of Osteoporosis:none   FH of hip fracture: none    Secondary causes of osteoprorsis: h/o cancer, chemotherapy, XRT    Calcium:  Diet 1800mg  Supplement 250mg 2 tab daily  Vitamin D:  D in Ca supplement 41 in 2009  Exercise: none    Follows up regularly with a dentist. No major dental procedures planned.     8/16 Hgb 9.0  Ca 7.7    7/20 she had high calcium  Ca 10.7   11/20 Ca 10.2  4/21 Ca 10.4  PTH 57.6    Past Medical History:   Diagnosis Date    Breast cancer ([facility] [address]     Bilateral breast cancer and presence of BRCA1 gene mutation    Broken clavicle 08/06/2021    Collagenous colitis     Hypertension     Osteoporosis     Ovarian cancer ([facility] [address]     Ovarian carcinoma diagnosed in 1998     Past Surgical History:   Procedure Laterality Date    BREAST BIOPSY      BREAST LUMPECTOMY  10/2009    Stage I (T1N0M0) invasive ductal carcinoma of the right breast, tumor size 1.5 cm, grade 3, estrogen-receptor negative, progesterone-receptor negative, HER-2/mikala 0 by immunohistochemistry, status post lumpectomy with positive deep margins    BREAST LUMPECTOMY  1983    Left breast carcinoma diagnosed  status post lumpectomy followed by seed implants and causes. Recommended pharmacological therapy. Started prolia in 2017, tolerating. She does have a history of spine fracture  Dexa 3/21 shows 5.2% decline at spine and 15.2 % increase at hip ( error in report as mentions decline, reviewed manually). 11.5% decline in distal radius. Osteopenia range in spine and hip  She had a high calcium in 7/20,repeat calcium/PTH normal  2. H/o Breast Cancer  3. H/o Ovarian Ca  4. Hypercalcemia: Noticed on labs in 2020,repeat nl, PTH nl. Ca normal 10/21    Plan:     Continue Prolia 60mg, due 11/21  Dexa next year  May switch to reclast or anabolic agent if BMD decline  Post menopausal osteoporosis . With fragility fracture. Discussed management of osteoporosis in detail. Discussed treatment options with her. Told her that given high risk of fracture and osteoporosis, I would recommend therapy for her, started prolia  Discussed the side effects including Osteonecrosis of the jaw and Atypical fractures. Patient understands that the risk is low.    In general advised to avoid falls

## 2023-09-19 ENCOUNTER — HOSPITAL ENCOUNTER (OUTPATIENT)
Dept: WOMENS IMAGING | Age: 88
Discharge: HOME | End: 2023-09-19
Attending: INTERNAL MEDICINE
Payer: MEDICARE

## 2023-09-19 DIAGNOSIS — M81.0 AGE-RELATED OSTEOPOROSIS WITHOUT CURRENT PATHOLOGICAL FRACTURE: ICD-10-CM

## 2023-09-19 PROCEDURE — 77080 DXA BONE DENSITY AXIAL: CPT
